# Patient Record
Sex: MALE | Race: WHITE | Employment: FULL TIME | URBAN - METROPOLITAN AREA
[De-identification: names, ages, dates, MRNs, and addresses within clinical notes are randomized per-mention and may not be internally consistent; named-entity substitution may affect disease eponyms.]

---

## 2019-09-10 ENCOUNTER — HOSPITAL ENCOUNTER (OUTPATIENT)
Dept: CT IMAGING | Age: 59
Discharge: HOME | End: 2019-09-10
Attending: SURGERY
Payer: MEDICARE

## 2019-09-10 DIAGNOSIS — Z12.11 COLON CANCER SCREENING: ICD-10-CM

## 2019-09-10 PROCEDURE — 74261 CT COLONOGRAPHY DX: CPT

## 2024-09-01 ENCOUNTER — APPOINTMENT (OUTPATIENT)
Facility: HOSPITAL | Age: 64
End: 2024-09-01
Payer: MEDICARE

## 2024-09-01 ENCOUNTER — HOSPITAL ENCOUNTER (EMERGENCY)
Facility: HOSPITAL | Age: 64
Discharge: HOME | End: 2024-09-01
Attending: EMERGENCY MEDICINE
Payer: MEDICARE

## 2024-09-01 VITALS
TEMPERATURE: 98.3 F | RESPIRATION RATE: 17 BRPM | WEIGHT: 220 LBS | OXYGEN SATURATION: 96 % | SYSTOLIC BLOOD PRESSURE: 144 MMHG | HEIGHT: 74 IN | HEART RATE: 78 BPM | DIASTOLIC BLOOD PRESSURE: 83 MMHG | BODY MASS INDEX: 28.23 KG/M2

## 2024-09-01 DIAGNOSIS — S01.01XA LACERATION OF SCALP, INITIAL ENCOUNTER: ICD-10-CM

## 2024-09-01 DIAGNOSIS — D32.9 MENINGIOMA (HCC): ICD-10-CM

## 2024-09-01 DIAGNOSIS — S09.90XA INJURY OF HEAD, INITIAL ENCOUNTER: Primary | ICD-10-CM

## 2024-09-01 DIAGNOSIS — T14.8XXA ABRASION: ICD-10-CM

## 2024-09-01 LAB — CREATININE, POC: 0.84 MG/DL (ref 0.51–1.19)

## 2024-09-01 PROCEDURE — 2500000003 HC RX 250 WO HCPCS: Performed by: EMERGENCY MEDICINE

## 2024-09-01 PROCEDURE — 73562 X-RAY EXAM OF KNEE 3: CPT

## 2024-09-01 PROCEDURE — 70450 CT HEAD/BRAIN W/O DYE: CPT

## 2024-09-01 PROCEDURE — 70498 CT ANGIOGRAPHY NECK: CPT

## 2024-09-01 PROCEDURE — 12002 RPR S/N/AX/GEN/TRNK2.6-7.5CM: CPT

## 2024-09-01 PROCEDURE — 82565 ASSAY OF CREATININE: CPT

## 2024-09-01 PROCEDURE — 99285 EMERGENCY DEPT VISIT HI MDM: CPT

## 2024-09-01 PROCEDURE — 6370000000 HC RX 637 (ALT 250 FOR IP): Performed by: EMERGENCY MEDICINE

## 2024-09-01 PROCEDURE — 6360000004 HC RX CONTRAST MEDICATION: Performed by: EMERGENCY MEDICINE

## 2024-09-01 RX ORDER — IOPAMIDOL 755 MG/ML
100 INJECTION, SOLUTION INTRAVASCULAR
Status: COMPLETED | OUTPATIENT
Start: 2024-09-01 | End: 2024-09-01

## 2024-09-01 RX ORDER — GINSENG 100 MG
CAPSULE ORAL ONCE
Status: COMPLETED | OUTPATIENT
Start: 2024-09-01 | End: 2024-09-01

## 2024-09-01 RX ADMIN — BACITRACIN: 500 OINTMENT TOPICAL at 16:55

## 2024-09-01 RX ADMIN — IOPAMIDOL 100 ML: 755 INJECTION, SOLUTION INTRAVENOUS at 16:18

## 2024-09-01 RX ADMIN — LIDOCAINE HYDROCHLORIDE 20 ML: 10; .005 INJECTION, SOLUTION EPIDURAL; INFILTRATION; INTRACAUDAL; PERINEURAL at 14:34

## 2024-09-01 RX ADMIN — Medication 3 ML: at 14:34

## 2024-09-01 ASSESSMENT — PAIN DESCRIPTION - LOCATION: LOCATION: HEAD

## 2024-09-01 ASSESSMENT — PAIN DESCRIPTION - ORIENTATION: ORIENTATION: ANTERIOR

## 2024-09-01 ASSESSMENT — LIFESTYLE VARIABLES
HOW MANY STANDARD DRINKS CONTAINING ALCOHOL DO YOU HAVE ON A TYPICAL DAY: PATIENT DOES NOT DRINK
HOW OFTEN DO YOU HAVE A DRINK CONTAINING ALCOHOL: NEVER

## 2024-09-01 ASSESSMENT — PAIN SCALES - GENERAL: PAINLEVEL_OUTOF10: 4

## 2024-09-01 ASSESSMENT — PAIN DESCRIPTION - DESCRIPTORS: DESCRIPTORS: BURNING

## 2024-09-01 NOTE — DISCHARGE INSTRUCTIONS
You were seen in the emergency department for your symptoms.  Please have the sutures removed in 5 to 7 days.  Please monitor for signs of infection.    Your CAT scan showed a meningioma.  Please follow-up with a neurosurgeon in your area or the one here.  You may also discuss with your family doctor.    Please return for new or worsening symptoms anytime.

## 2024-09-01 NOTE — ED PROVIDER NOTES
ointment    Procedure completion:  Tolerated well, no immediate complications         CRITICAL CARE TIME   0    EMERGENCY DEPARTMENT COURSE and DIFFERENTIAL DIAGNOSIS/MDM   Vitals:    Vitals:    09/01/24 1410 09/01/24 1500 09/01/24 1512 09/01/24 1627   BP:   135/80 (!) 144/83   Pulse:  81 82 78   Resp:  19 18 17   Temp:       TempSrc:       SpO2:  92% 93% 96%   Weight: 99.8 kg (220 lb)      Height: 1.88 m (6' 2\")           Patient was given the following medications:  Medications   lidocaine-EPINEPHrine-tetracaine (LET) topical gel 3 mL syringe (3 mLs Topical Given 9/1/24 1434)   lidocaine-EPINEPHrine 1 percent-1:[id number] injection 20 mL (20 mLs IntraDERmal Given 9/1/24 1434)   bacitracin ointment ( Topical Given 9/1/24 1655)   iopamidol (ISOVUE-370) 76 % injection 100 mL (100 mLs IntraVENous Given 9/1/24 1618)       Medical Decision Making  64-year-old male presents emerged part with a chief complaint of closed head injury.  Vital signs are unremarkable.  Patient believes he is up-to-date on tetanus.  Given mechanism will CT head.  Will check plain film of left knee. Patient ambulatory in ED with multiple abrasions. Will place let and repair patient's laceration.    Amount and/or Complexity of Data Reviewed  Independent Historian: EMS  Labs: ordered. Decision-making details documented in ED Course.  Radiology: ordered and independent interpretation performed. Decision-making details documented in ED Course.    Risk  OTC drugs.  Prescription drug management.          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 [MB]   1521 Plain film of the left knee as interpreted by me shows prior left patella fracture. [MB]   1709 CTA of the head and neck does not reveal any evidence of contained bleeding.  There is an incidental meningioma noted.  Will inform patient of this and provide neurosurgery referrals. [MB]      ED Course